# Patient Record
Sex: FEMALE | Race: WHITE
[De-identification: names, ages, dates, MRNs, and addresses within clinical notes are randomized per-mention and may not be internally consistent; named-entity substitution may affect disease eponyms.]

---

## 2017-06-18 ENCOUNTER — HOSPITAL ENCOUNTER (EMERGENCY)
Dept: HOSPITAL 72 - EMR | Age: 42
Discharge: HOME | End: 2017-06-18
Payer: COMMERCIAL

## 2017-06-18 VITALS — SYSTOLIC BLOOD PRESSURE: 104 MMHG | DIASTOLIC BLOOD PRESSURE: 68 MMHG

## 2017-06-18 VITALS — HEIGHT: 63 IN | BODY MASS INDEX: 22.15 KG/M2 | WEIGHT: 125 LBS

## 2017-06-18 DIAGNOSIS — O34.11: ICD-10-CM

## 2017-06-18 DIAGNOSIS — D25.9: ICD-10-CM

## 2017-06-18 DIAGNOSIS — O03.4: Primary | ICD-10-CM

## 2017-06-18 DIAGNOSIS — Z3A.12: ICD-10-CM

## 2017-06-18 DIAGNOSIS — Z88.1: ICD-10-CM

## 2017-06-18 LAB
BASOPHILS NFR BLD AUTO: 0.7 % (ref 0–2)
EOSINOPHIL NFR BLD AUTO: 1.2 % (ref 0–3)
ERYTHROCYTE [DISTWIDTH] IN BLOOD BY AUTOMATED COUNT: 11.2 % (ref 11.6–14.8)
LYMPHOCYTES NFR BLD AUTO: 16.2 % (ref 20–45)
MCH RBC QN AUTO: 30.6 PG (ref 27–31)
MCHC RBC AUTO-ENTMCNC: 34.2 G/DL (ref 32–36)
MCV RBC AUTO: 89 FL (ref 80–99)
MONOCYTES NFR BLD AUTO: 7.7 % (ref 1–10)
NEUTROPHILS NFR BLD AUTO: 74.2 % (ref 45–75)
PLATELET # BLD: 224 K/UL (ref 150–450)
PMV BLD AUTO: 5.9 FL (ref 6.5–10.1)
RBC # BLD AUTO: 4.26 M/UL (ref 4.2–5.4)
WBC # BLD AUTO: 13.1 K/UL (ref 4.8–10.8)

## 2017-06-18 PROCEDURE — 76856 US EXAM PELVIC COMPLETE: CPT

## 2017-06-18 PROCEDURE — 76830 TRANSVAGINAL US NON-OB: CPT

## 2017-06-18 PROCEDURE — 96360 HYDRATION IV INFUSION INIT: CPT

## 2017-06-18 PROCEDURE — 84702 CHORIONIC GONADOTROPIN TEST: CPT

## 2017-06-18 PROCEDURE — 86850 RBC ANTIBODY SCREEN: CPT

## 2017-06-18 PROCEDURE — 85025 COMPLETE CBC W/AUTO DIFF WBC: CPT

## 2017-06-18 PROCEDURE — 86901 BLOOD TYPING SEROLOGIC RH(D): CPT

## 2017-06-18 PROCEDURE — 81025 URINE PREGNANCY TEST: CPT

## 2017-06-18 PROCEDURE — 86900 BLOOD TYPING SEROLOGIC ABO: CPT

## 2017-06-18 PROCEDURE — 36415 COLL VENOUS BLD VENIPUNCTURE: CPT

## 2017-06-18 NOTE — EMERGENCY ROOM REPORT
History of Present Illness


General


Chief Complaint:  Pregnancy Complications





Present Illness


HPI


40 YO Female presents to the ED C/O moderate vaginal bleeding with cramping 

since this am. pt. states she is approximately 12 weeks pregnant and when she 

had routine US Performed on Wednesday she was told there is nonviable fetus and 

to anticipate miscarriage by her nurse midwife.  Patient states she is not 

currently have an OB/GYN and she had no symptoms until today when she had 

sudden onset cramping and moderate vaginal bleeding dark red blood with the 

passage of clots. pt reports current pain is 3/10 in severity and described as 

uterine cramping. Patient's nurse midwife told patient that she should be 

evaluated by emergency department for moderate amount of blood loss patient 

reports intermittent dizziness.  Patient is  with one prior miscarriage 

and one prior .  She states she is currently breast-feeding.  Patient 

denies fevers or chills.  She denies trauma. pt. reports uterine tenderness.  

Denies CP, Palpitations, LOC, AMS, dizziness, Changes in Vision, Sensation, 

paresthesias, or a sudden severe headache.


Allergies:  


Coded Allergies:  


     ERYTHROMYCIN BASE (Verified  Allergy, Unknown, 17)


     TETRACYCLINE (Verified  Allergy, Unknown, 17)





Patient History


Past Medical History:  see triage record


Past Surgical History:  none


Pertinent Family History:  none


Last Menstrual Period:  3-21


Pregnant Now:  Yes - 12 weeks pregnant


:  4


Para:  1


Reviewed Nursing Documentation:  PMH: Agreed, PSxH: Agreed





Review of Systems


All Other Systems:  negative except mentioned in HPI





Physical Exam





Vital Signs








  Date Time  Temp Pulse Resp B/P Pulse Ox O2 Delivery O2 Flow Rate FiO2


 


17 17:29 98.1 88 16 137/72 98 Room Air  








Sp02 EP Interpretation:  reviewed, normal


General Appearance:  no apparent distress, alert, GCS 15, non-toxic


Head:  normocephalic, atraumatic


Eyes:  bilateral eye PERRL, bilateral eye normal inspection


ENT:  hearing grossly normal, normal pharynx, no angioedema, normal voice


Neck:  full range of motion, supple/symm/no masses


Respiratory:  chest non-tender, lungs clear, normal breath sounds, speaking 

full sentences


Cardiovascular #1:  regular rate, rhythm, no edema


Gastrointestinal:  normal bowel sounds, soft, no guarding, no rebound, other - 

lower abdominal TTP to deep palpation uterine/bladder area, otherwise 

unremarkable no avidence of acute abdomen


Rectal:  deferred


Genitourinary:  normal inspection, no CVA tenderness, ext genitalia/vag normal, 

os closed, other - moderate bright red blood in the vaginal canal, no clots, os 

is closed.


Musculoskeletal:  back normal, gait/station normal, normal range of motion, non-

tender


Neurologic:  alert, oriented x3, responsive, motor strength/tone normal, 

sensory intact, speech normal


Psychiatric:  judgement/insight normal, memory normal, mood/affect normal


Skin:  normal color, no rash, warm/dry, well hydrated


Lymphatic:  no adenopathy





Medical Decision Making


PA Attestation


Dr. Wan  is my supervising Physician whom patient management has been 

discussed with.


Diagnostic Impression:  


 Primary Impression:  


 Incomplete miscarriage


ER Course


40 YO Female presents to the ED C/O moderate vaginal bleeding with cramping 

since this am. pt. states she is approximately 12 weeks pregnant and when she 

had routine US Performed on Wednesday she was told there is nonviable fetus and 

to anticipate miscarriage by her nurse midwife.  Patient states she is not 

currently have an OB/GYN and she had no symptoms until today when she had 

sudden onset cramping and moderate vaginal bleeding dark red blood with the 

passage of clots. pt reports current pain is 3/10 in severity and described as 

uterine cramping. Patient's nurse midwife told patient that she should be 

evaluated by emergency department for moderate amount of blood loss patient 

reports intermittent dizziness.  Patient is  with one prior miscarriage 

and one prior .  She states she is currently breast-feeding.  Patient 

denies fevers or chills.  She denies trauma. pt. reports uterine tenderness. 





Ddx considered but are not limited to: Fibroid, ectopic pregnancy, Fibroid, 

Spontaneous , incomplete miscarriage.


Vital signs: are WNL, pt. is afebrile, non hypotensive, non-tachycardic. 


Pelvic Exam: bright red blood noted, os is closed no visible clots. 


H&PE are most consistent with: incomplete miscarriage


ORDERS: 


-CBC: H&H WNL 13.1/38%, WBC's are mildly elevated at 13.1 but WNL for pregnancy.


-Urine hcg- Positive 


-serum Hcg Quant:  1495


- Blood/RH type and screen- see attached labs 


-Pelvic US complete- retained POC and uterine fibroid. 





ED INTERVENTIONS: 


-1 Liter NS Bolus


-500mg Keflex PO





OBGYN CONSULT:  Dr. Forrest: spoke with on-call OBGYN who agreed to see the 

pt. in [facility], instructed me to rx Methergine 0.2mg PO Q6Hr # 6 in 

addition to Doxycycline BID x 7 days ---    





Pt. is currently Breast feeding Dr. Wan suggested doxycycline be switched to 

Keflex. 


- Pt. states she does not want to stop Breast feeding, d/w pt. that I will 

provide her with rx for Methergine in case she changes her mind. instructed pt. 

to follow up tomorrow with Dr. Forrest's [facility] office. and to take 

antibiotics in the meantime. 





DISCHARGE: At this time pt. is stable for d/c to home with close outpatient 

follow up with OBGYN. Will provide printed patient care instructions, and any 

necessary prescriptions. Care plan and follow up instructions have been 

discussed with the patient prior to discharge.





Labs








Test


  17


18:20 17


18:26


 


White Blood Count


  13.1 K/UL


(4.8-10.8) 


 


 


Red Blood Count


  4.26 M/UL


(4.20-5.40) 


 


 


Hemoglobin


  13.1 G/DL


(12.0-16.0) 


 


 


Hematocrit


  38.1 %


(37.0-47.0) 


 


 


Mean Corpuscular Volume 89 FL (80-99)  


 


Mean Corpuscular Hemoglobin


  30.6 PG


(27.0-31.0) 


 


 


Mean Corpuscular Hemoglobin


Concent 34.2 G/DL


(32.0-36.0) 


 


 


Red Cell Distribution Width


  11.2 %


(11.6-14.8) 


 


 


Platelet Count


  224 K/UL


(150-450) 


 


 


Mean Platelet Volume


  5.9 FL


(6.5-10.1) 


 


 


Neutrophils (%) (Auto)


  74.2 %


(45.0-75.0) 


 


 


Lymphocytes (%) (Auto)


  16.2 %


(20.0-45.0) 


 


 


Monocytes (%) (Auto)


  7.7 %


(1.0-10.0) 


 


 


Eosinophils (%) (Auto)


  1.2 %


(0.0-3.0) 


 


 


Basophils (%) (Auto)


  0.7 %


(0.0-2.0) 


 


 


Human Chorionic Gonadotropin,


Quant 1495 mIU/mL 


  


 


 


Urine HCG, Qualitative  Positive 











Last Vital Signs








  Date Time  Temp Pulse Resp B/P Pulse Ox O2 Delivery O2 Flow Rate FiO2


 


17 17:29 98.1 88 16 137/72 98 Room Air  








Disposition:  HOME, SELF-CARE


Condition:  Stable


Physician Consult:  Dr. Adriane VALDEZ


Scripts


Methylergonovine Maleate (METHYLERGONOVINE MALEATE) 0.2 Mg Tablet


0.2 MG ORAL Q6HR, #6 TAB


   Prov: Magaly Mart         17 


Cephalexin* (KEFLEX*) 500 Mg Capsule


500 MG ORAL EVERY 12 HOURS for 7 Days, #14 CAP 0 Refills


   Prov: Magaly Mart         17


Referrals:  


HOLLIE FORREST


Patient Instructions:  Incomplete Miscarriage





Additional Instructions:  


Take medications as directed. **Do not drink breast feed while taking 

Methergine and for 12 hours after last dose.





Follow up with  Dr. Adriane VALDEZ at the [facility] [phone]





Return sooner to ED if new symptoms occur, or current symptoms become worse.


 











- Please note that this Emergency Department Report was dictated using picoChip technology software, occasionally this can lead to 

erroneous entry secondary to interpretation by the dictation equipment.











Magaly Mart 2017 18:23

## 2017-06-19 NOTE — DIAGNOSTIC IMAGING REPORT
Indications: Patient states positive pregnancy test June 1, now presents with

vaginal bleeding, LMP 3/21/17



Technique: Transabdominal and transvaginal real-time grayscale and duplex Doppler

imaging of the pelvis was performed.



Findings:



Comparison: None



Uterus  measures 12.4 x 9 x 8.6cm. It  demonstrates a 3.3 cm solid heterogeneous

mass in its anterior myometrium, somewhat hypervascular on color Doppler imaging..

The endometrial complex measures approximately 18 mm in diameter. It contains 

an

irregular saclike structure with internal echogenicity, possible small yolk sac. 

No

discernible fetal pole or cardiac activity.Endocervical canal is dilated and filled

with heterogeneous material. No Free fluid is present in the cul-de-sac.



Right ovary measures 3.5 x 3.4 x 1.5 cm. It is unremarkable in appearance. Duplex

Doppler imaging demonstrates normal blood flow. No extra-ovarian abnormality is

seen.



Left ovary measures 3.4 x 3.2 x 1.9 cm. It is unremarkable in appearance. Duplex

Doppler imaging demonstrates normal blood flow. No extra-ovarian abnormality is

seen.



IMPRESSION:



Findings compatible with nonviable intrauterine pregnancy with retained products 

of

conception



Uterine fibroid



Adnexa unremarkable

## 2018-11-12 ENCOUNTER — HOSPITAL ENCOUNTER (EMERGENCY)
Dept: HOSPITAL 72 - EMR | Age: 43
LOS: 1 days | Discharge: HOME | End: 2018-11-13
Payer: COMMERCIAL

## 2018-11-12 VITALS — HEIGHT: 63 IN | BODY MASS INDEX: 23.04 KG/M2 | WEIGHT: 130 LBS

## 2018-11-12 DIAGNOSIS — K52.9: Primary | ICD-10-CM

## 2018-11-12 DIAGNOSIS — Z88.1: ICD-10-CM

## 2018-11-12 LAB
ADD MANUAL DIFF: YES
ALBUMIN SERPL-MCNC: 4.3 G/DL (ref 3.4–5)
ALBUMIN/GLOB SERPL: 1 {RATIO} (ref 1–2.7)
ALP SERPL-CCNC: 61 U/L (ref 46–116)
ALT SERPL-CCNC: 12 U/L (ref 12–78)
ANION GAP SERPL CALC-SCNC: 12 MMOL/L (ref 5–15)
AST SERPL-CCNC: 17 U/L (ref 15–37)
BILIRUB DIRECT SERPL-MCNC: 0.2 MG/DL (ref 0–0.3)
BILIRUB SERPL-MCNC: 1.1 MG/DL (ref 0.2–1)
BUN SERPL-MCNC: 13 MG/DL (ref 7–18)
CALCIUM SERPL-MCNC: 9.2 MG/DL (ref 8.5–10.1)
CHLORIDE SERPL-SCNC: 105 MMOL/L (ref 98–107)
CO2 SERPL-SCNC: 24 MMOL/L (ref 21–32)
CREAT SERPL-MCNC: 0.8 MG/DL (ref 0.55–1.3)
ERYTHROCYTE [DISTWIDTH] IN BLOOD BY AUTOMATED COUNT: 10.8 % (ref 11.6–14.8)
GLOBULIN SER-MCNC: 4.5 G/DL
HCT VFR BLD CALC: 48.8 % (ref 37–47)
HGB BLD-MCNC: 17.2 G/DL (ref 12–16)
MCV RBC AUTO: 89 FL (ref 80–99)
PLATELET # BLD: 315 K/UL (ref 150–450)
POTASSIUM SERPL-SCNC: 3.5 MMOL/L (ref 3.5–5.1)
RBC # BLD AUTO: 5.46 M/UL (ref 4.2–5.4)
SODIUM SERPL-SCNC: 141 MMOL/L (ref 136–145)
WBC # BLD AUTO: 15.7 K/UL (ref 4.8–10.8)

## 2018-11-12 PROCEDURE — 36415 COLL VENOUS BLD VENIPUNCTURE: CPT

## 2018-11-12 PROCEDURE — 80053 COMPREHEN METABOLIC PANEL: CPT

## 2018-11-12 PROCEDURE — 82248 BILIRUBIN DIRECT: CPT

## 2018-11-12 PROCEDURE — 96365 THER/PROPH/DIAG IV INF INIT: CPT

## 2018-11-12 PROCEDURE — 96374 THER/PROPH/DIAG INJ IV PUSH: CPT

## 2018-11-12 PROCEDURE — 96375 TX/PRO/DX INJ NEW DRUG ADDON: CPT

## 2018-11-12 PROCEDURE — 83690 ASSAY OF LIPASE: CPT

## 2018-11-12 PROCEDURE — 85025 COMPLETE CBC W/AUTO DIFF WBC: CPT

## 2018-11-12 PROCEDURE — 81025 URINE PREGNANCY TEST: CPT

## 2018-11-12 PROCEDURE — 96361 HYDRATE IV INFUSION ADD-ON: CPT

## 2018-11-12 PROCEDURE — 85007 BL SMEAR W/DIFF WBC COUNT: CPT

## 2018-11-12 PROCEDURE — 99284 EMERGENCY DEPT VISIT MOD MDM: CPT

## 2018-11-12 RX ADMIN — DIPHENHYDRAMINE HYDROCHLORIDE ONE MG: 50 INJECTION INTRAMUSCULAR; INTRAVENOUS at 22:47

## 2018-11-12 RX ADMIN — LORAZEPAM ONE MG: 2 INJECTION, SOLUTION INTRAMUSCULAR; INTRAVENOUS at 22:48

## 2018-11-12 NOTE — EMERGENCY ROOM REPORT
History of Present Illness


General


Chief Complaint:  Abdominal Pain


Source:  Patient





Present Illness


HPI


Patient presents with complaints of vomiting and diarrhea


She reports that she just returned from [place] yesterday and this morning she 

started with her symptoms diffuse abdominal cramping denies any blood in the 

vomit or diarrhea denies any chest pain or shortness of breath





Patient reports that her 2-year-old child had similar symptoms but she felt 

that that was from swallowing pool water


Denies any rash denies any fevers denies any lower abdominal pain


Allergies:  


Coded Allergies:  


     ERYTHROMYCIN BASE (Verified  Allergy, Unknown, 11/12/18)


     TETRACYCLINE (Verified  Allergy, Unknown, 11/12/18)





Patient History


Past Medical History:  see triage record


Pertinent Family History:  none


Last Menstrual Period:  NOW


Reviewed Nursing Documentation:  PMH: Agreed; PSxH: Agreed





Nursing Documentation-PMH


Past Medical History:  No Stated History





Review of Systems


All Other Systems:  negative except mentioned in HPI





Physical Exam





Vital Signs








  Date Time  Temp Pulse Resp B/P (MAP) Pulse Ox O2 Delivery O2 Flow Rate FiO2


 


11/12/18 21:59 97.5 86 16 104/62 98 Room Air  








Sp02 EP Interpretation:  reviewed, normal


General Appearance:  well appearing, no apparent distress


Head:  normocephalic, atraumatic


Eyes:  bilateral eye PERRL, bilateral eye EOMI


ENT:  hearing grossly normal, normal pharynx, TMs + canals normal, uvula midline


Neck:  full range of motion, supple, no meningismus, no bony tend


Respiratory:  lungs clear, normal breath sounds, no rhonchi, no respiratory 

distress, no retraction, no accessory muscle use


Cardiovascular #1:  normal peripheral pulses, regular rate, rhythm, no edema, 

no gallop, no JVD, no murmur


Gastrointestinal:  normal bowel sounds, non tender, soft, no mass, no 

organomegaly, non-distended, no guarding, no hernia, no pulsatile mass, no 

rebound


Genitourinary:  no CVA tenderness


Musculoskeletal:  normal inspection


Neurologic:  oriented x3, responsive, CNs III-XII nml as tested, motor strength/

tone normal, sensory intact


Psychiatric:  mood/affect normal


Skin:  normal color, no rash, warm/dry, palpation normal


Lymphatic:  normal inspection, no adenopathy





Medical Decision Making


Diagnostic Impression:  


 Primary Impression:  


 Vomiting


 Additional Impressions:  


 Diarrhea


 Colitis


ER Course


Given the patient's history and exam and presentation multiple differentials 

are considered patient is provided IV hydration and antinausea





Patient's blood work reveals elevated white blood cell count


This raises some concern of possible infectious pathology such as appendicitis 

however patient continues to have lower abdominal exam that is benign and 

without any pain





Patient was provided with Cipro


There is a consideration of gastro-enteral pathology given the vomiting and 

diarrhea and the recent travel





And patient will have initial conservative outpatient trial





Labs








Test


  11/12/18


22:05 11/12/18


22:40


 


Urine HCG, Qualitative


  Negative


(NEGATIVE) 


 


 


White Blood Count


  


  15.7 K/UL


(4.8-10.8)


 


Red Blood Count


  


  5.46 M/UL


(4.20-5.40)


 


Hemoglobin


  


  17.2 G/DL


(12.0-16.0)


 


Hematocrit


  


  48.8 %


(37.0-47.0)


 


Mean Corpuscular Volume  89 FL (80-99) 


 


Mean Corpuscular Hemoglobin


  


  31.4 PG


(27.0-31.0)


 


Mean Corpuscular Hemoglobin


Concent 


  35.1 G/DL


(32.0-36.0)


 


Red Cell Distribution Width


  


  10.8 %


(11.6-14.8)


 


Platelet Count


  


  315 K/UL


(150-450)


 


Mean Platelet Volume


  


  5.5 FL


(6.5-10.1)


 


Neutrophils (%) (Auto)   % (45.0-75.0) 


 


Lymphocytes (%) (Auto)   % (20.0-45.0) 


 


Monocytes (%) (Auto)   % (1.0-10.0) 


 


Eosinophils (%) (Auto)   % (0.0-3.0) 


 


Basophils (%) (Auto)   % (0.0-2.0) 


 


Differential Total Cells


Counted 


  100 


 


 


Neutrophils % (Manual)  87 % (45-75) 


 


Lymphocytes % (Manual)  7 % (20-45) 


 


Monocytes % (Manual)  6 % (1-10) 


 


Eosinophils % (Manual)  0 % (0-3) 


 


Basophils % (Manual)  0 % (0-2) 


 


Band Neutrophils  0 % (0-8) 


 


Platelet Estimate  Adequate 


 


Platelet Morphology  Normal 


 


Red Blood Cell Morphology  Normal 


 


Sodium Level


  


  141 MMOL/L


(136-145)


 


Potassium Level


  


  3.5 MMOL/L


(3.5-5.1)


 


Chloride Level


  


  105 MMOL/L


()


 


Carbon Dioxide Level


  


  24 MMOL/L


(21-32)


 


Anion Gap


  


  12 mmol/L


(5-15)


 


Blood Urea Nitrogen


  


  13 mg/dL


(7-18)


 


Creatinine


  


  0.8 MG/DL


(0.55-1.30)


 


Estimat Glomerular Filtration


Rate 


  > 60 mL/min


(>60)


 


Glucose Level


  


  135 MG/DL


()


 


Calcium Level


  


  9.2 MG/DL


(8.5-10.1)


 


Total Bilirubin


  


  1.1 MG/DL


(0.2-1.0)


 


Direct Bilirubin


  


  0.2 MG/DL


(0.0-0.3)


 


Aspartate Amino Transf


(AST/SGOT) 


  17 U/L (15-37) 


 


 


Alanine Aminotransferase


(ALT/SGPT) 


  12 U/L (12-78) 


 


 


Alkaline Phosphatase


  


  61 U/L


()


 


Total Protein


  


  8.8 G/DL


(6.4-8.2)


 


Albumin


  


  4.3 G/DL


(3.4-5.0)


 


Globulin  4.5 g/dL 


 


Albumin/Globulin Ratio  1.0 (1.0-2.7) 


 


Lipase


  


  145 U/L


()











Last Vital Signs








  Date Time  Temp Pulse Resp B/P (MAP) Pulse Ox O2 Delivery O2 Flow Rate FiO2


 


11/12/18 21:59 97.5 86 16 104/62 98 Room Air  








Status:  improved


Disposition:  HOME, SELF-CARE


Condition:  Improved


Scripts


Ondansetron (Zofran) 4 Mg Tablet


4 MG ORAL Q8HR PRN for Nausea & Vomiting, #12 TAB


   Prov: Judy Ayers DO         11/13/18 


Ciprofloxacin Hcl* (CIPROFLOXACIN HCL*) 500 Mg Tablet


500 MG ORAL Q12H, #10 TAB 0 Refills


   Prov: Judy Ayers DO         11/13/18





Additional Instructions:  


Patient is provided with the discharge instructions notified to follow up with 

primary doctor in the next 2-3 days otherwise return to the er with any 

worsening symptoms.


Please note that this report is being documented using [employer]AG[employer] technology.  This 

can lead to erroneous entry secondary to incorrect interpretation by the 

dictating instrument.











Judy Ayers DO Nov 12, 2018 22:28

## 2018-11-13 VITALS — SYSTOLIC BLOOD PRESSURE: 115 MMHG | DIASTOLIC BLOOD PRESSURE: 75 MMHG

## 2018-11-13 VITALS — DIASTOLIC BLOOD PRESSURE: 62 MMHG | SYSTOLIC BLOOD PRESSURE: 104 MMHG
